# Patient Record
Sex: FEMALE | Race: WHITE | RURAL
[De-identification: names, ages, dates, MRNs, and addresses within clinical notes are randomized per-mention and may not be internally consistent; named-entity substitution may affect disease eponyms.]

---

## 2020-03-26 ENCOUNTER — TELEPHONE (OUTPATIENT)
Dept: FAMILY MEDICINE CLINIC | Facility: CLINIC | Age: 35
End: 2020-03-26

## 2020-03-26 NOTE — TELEPHONE ENCOUNTER
Patient notified that we have not seen her in several years and she would be considered new. I did tell her she is welcome to make appointment once we are accepting new patients again if she needs to be seen for other reasons.

## 2020-03-26 NOTE — TELEPHONE ENCOUNTER
Patient is requesting note to give employer to take off work for two weeks due to having chronic bronchitis & living with her parents whom are in their 60s. 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 Patient was coughing during th entire conversation.